# Patient Record
Sex: FEMALE | Race: WHITE | NOT HISPANIC OR LATINO | Employment: UNEMPLOYED | URBAN - NONMETROPOLITAN AREA
[De-identification: names, ages, dates, MRNs, and addresses within clinical notes are randomized per-mention and may not be internally consistent; named-entity substitution may affect disease eponyms.]

---

## 2017-07-07 ENCOUNTER — HISTORY (OUTPATIENT)
Dept: INTERNAL MEDICINE | Facility: OTHER | Age: 37
End: 2017-07-07

## 2017-07-07 ENCOUNTER — OFFICE VISIT - GICH (OUTPATIENT)
Dept: INTERNAL MEDICINE | Facility: OTHER | Age: 37
End: 2017-07-07

## 2017-07-07 DIAGNOSIS — R22.33 LOCALIZED SWELLING, MASS AND LUMP, UPPER LIMB, BILATERAL: ICD-10-CM

## 2017-07-07 DIAGNOSIS — Z91.09 OTHER ALLERGY STATUS, OTHER THAN TO DRUGS AND BIOLOGICAL SUBSTANCES: ICD-10-CM

## 2017-07-07 DIAGNOSIS — D72.10 EOSINOPHILIA: ICD-10-CM

## 2017-07-07 LAB
ABSOLUTE BASOPHILS - HISTORICAL: 0.1 THOU/CU MM
ABSOLUTE EOSINOPHILS - HISTORICAL: 0.9 THOU/CU MM
ABSOLUTE IMMATURE GRANULOCYTES(METAS,MYELOS,PROS) - HISTORICAL: 0 THOU/CU MM
ABSOLUTE LYMPHOCYTES - HISTORICAL: 1.5 THOU/CU MM (ref 0.9–2.9)
ABSOLUTE MONOCYTES - HISTORICAL: 0.4 THOU/CU MM
ABSOLUTE NEUTROPHILS - HISTORICAL: 4 THOU/CU MM (ref 1.7–7)
BASOPHILS # BLD AUTO: 0.7 %
EOSINOPHIL NFR BLD AUTO: 12.5 %
ERYTHROCYTE [DISTWIDTH] IN BLOOD BY AUTOMATED COUNT: 11.8 % (ref 11.5–15.5)
HCT VFR BLD AUTO: 40.4 % (ref 33–51)
HEMOGLOBIN: 14 G/DL (ref 12–16)
IMMATURE GRANULOCYTES(METAS,MYELOS,PROS) - HISTORICAL: 0.1 %
LYMPHOCYTES NFR BLD AUTO: 21.8 % (ref 20–44)
MCH RBC QN AUTO: 32.3 PG (ref 26–34)
MCHC RBC AUTO-ENTMCNC: 34.7 G/DL (ref 32–36)
MCV RBC AUTO: 93 FL (ref 80–100)
MONOCYTES NFR BLD AUTO: 5.6 %
NEUTROPHILS NFR BLD AUTO: 59.3 % (ref 42–72)
PLATELET # BLD AUTO: 178 THOU/CU MM (ref 140–440)
PMV BLD: 9.4 FL (ref 6.5–11)
RED BLOOD COUNT - HISTORICAL: 4.33 MIL/CU MM (ref 4–5.2)
WHITE BLOOD COUNT - HISTORICAL: 6.8 THOU/CU MM (ref 4.5–11)

## 2017-07-07 ASSESSMENT — PATIENT HEALTH QUESTIONNAIRE - PHQ9: SUM OF ALL RESPONSES TO PHQ QUESTIONS 1-9: 5

## 2017-07-07 ASSESSMENT — ANXIETY QUESTIONNAIRES
6. BECOMING EASILY ANNOYED OR IRRITABLE: SEVERAL DAYS
7. FEELING AFRAID AS IF SOMETHING AWFUL MIGHT HAPPEN: SEVERAL DAYS
GAD7 TOTAL SCORE: 11
4. TROUBLE RELAXING: MORE THAN HALF THE DAYS
1. FEELING NERVOUS, ANXIOUS, OR ON EDGE: MORE THAN HALF THE DAYS
3. WORRYING TOO MUCH ABOUT DIFFERENT THINGS: MORE THAN HALF THE DAYS
2. NOT BEING ABLE TO STOP OR CONTROL WORRYING: MORE THAN HALF THE DAYS
5. BEING SO RESTLESS THAT IT IS HARD TO SIT STILL: SEVERAL DAYS

## 2017-11-26 ENCOUNTER — HEALTH MAINTENANCE LETTER (OUTPATIENT)
Age: 37
End: 2017-11-26

## 2017-12-28 NOTE — PATIENT INSTRUCTIONS
Patient Information     Patient Name MRN Sex Rosie Harkins [id number] Female 1980      Patient Instructions by Anayeli Zhang DO at 2017  8:40 AM     Author:  Anayeli Zhang DO Service:  (none) Author Type:  PHYS- Osteopathic     Filed:  2017  9:17 AM Encounter Date:  2017 Status:  Signed     :  Anayeli Zhang DO (PHYS- Osteopathic)            - Return/call as needed for follow-up should any new symptoms develop, for worsening of current symptoms or if symptoms do not resolve

## 2017-12-28 NOTE — PROGRESS NOTES
Patient Information     Patient Name MRN Rosie Duran [id number] Female 1980      Progress Notes by Anayeli Zhang DO at 2017  8:40 AM     Author:  Anayeli Zhang DO Service:  (none) Author Type:  PHYS- Osteopathic     Filed:  2017 10:45 PM Encounter Date:  2017 Status:  Signed     :  Anayeli Zhang DO (PHYS- Osteopathic)            Chief Complaint     Patient presents with       Lump      glands swelling under arms x 4 months        Subjective:   Ms. Waters is a 37 y.o. female  seen for the acute concern today of swollen glands under her arm bilaterally.  She noted these off and on over the last 3 months.  In 2017, she had an excisional biopsy of a breast lump and that morning was the first day she noted the lymph node.  She does report that the lumps are tender.  She denies any other symptoms of infection.  The records from her biopsy are reviewed in care everywhere and it showed that she had a persistent lump that had previously been biopsied and identified as a fibroadenoma.  Due to persistence of the lump she requested excision. Pathology from this showed fibrocystic change with areas showing pseudo-angiomatous stromal hyperplasia (PASH).  No evidence of atypical epithelial hyperplasia or malignancy.  More recently she has noted improvement from prior.    She is also concerned that this is related to the high eosinophil count that she has been told she has had in the past. We discussed that elevated eosinophils can be from several things including allergies, certain infections and autoimmune illness.  She does report have occasional allergy symptoms and is open to meds.    She  reports that she has never smoked. She has never used smokeless tobacco.  Rare alcohol use, no drug use.  She does not have a family history of breast cancer in her immediate family however does have breast and colon cancer in grandparents.  She does not have any known drug allergies.    Past  "medical history reviewed as below:     Past Medical History:     Diagnosis  Date     Fibroadenoma of right breast      Infertility      Irritable bowel    .  Current Outpatient Prescriptions on File Prior to Visit       Medication  Sig Dispense Refill     Cholecalciferol, Vitamin D3, (VITAMIN D-3) 5,000 unit tab Take  by mouth once daily.  0     No current facility-administered medications on file prior to visit.          ROS:   Pertinent ROS was performed and was negative, including for fever, chills, sores in her mouth, difficulty swallowing, SOB, palpitations, chest pain, changes in bowel or bladder, increased MARK, skin rashes or lesions.  Discharge from nipple or breast changes. No other concerns, with exception of HPI above.      Objective:    /68  Pulse 68  Temp 96.3  F (35.7  C) (Tympanic)  Resp 18  Ht 1.651 m (5' 5\")  Wt 49.5 kg (109 lb 2 oz)  Breastfeeding? No  BMI 18.16 kg/m2  GEN: Vitals reviewed.  Patient is in no acute distress. Cooperative with exam.  HEENT: Normocephalic atraumatic.  Pupils equally round.  No scleral icterus, no conjunctival erythema. Oropharynx with no erythema or exudates. Dentition adequate.  LYMPH: No LAD noted in the neck bilaterally.  No LAD in groin.  No LAD noted in left axilla.  Very small area in right axilla (2-3mm across) of hardened tissue possibly c/w resolving LN.  CV: Heart regular in rate and rhythm with no murmur.    LUNGS: Lungs clear to auscultation bilaterally.  Chest rise equal bilaterally.  No accessory muscle use.  ABD:  Soft, nontender, and nondistended.  No rebound. Bowel sounds positive.  SKIN: Warm and dry to touch.  No rash on face, arms and legs.  Axilla is clear shaven with a few areas of folliculitis bilaterally.  EXT: No clubbing or cyanosis.  No peripheral edema.  PSYCH: Mood is good although obviously anxious.  Affect is appropriate.  Thought processes are normal.    LABS: 7/7/2017 - Personally ordered/reviewed  Results for orders placed " or performed in visit on 07/07/17      CBC WITH AUTO DIFFERENTIAL      Result  Value Ref Range    WHITE BLOOD COUNT         6.8 4.5 - 11.0 thou/cu mm    RED BLOOD COUNT           4.33 4.00 - 5.20 mil/cu mm    HEMOGLOBIN                14.0 12.0 - 16.0 g/dL    HEMATOCRIT                40.4 33.0 - 51.0 %    MCV                       93 80 - 100 fL    MCH                       32.3 26.0 - 34.0 pg    MCHC                      34.7 32.0 - 36.0 g/dL    RDW                       11.8 11.5 - 15.5 %    PLATELET COUNT            178 140 - 440 thou/cu mm    MPV                       9.4 6.5 - 11.0 fL    NEUTROPHILS               59.3 42.0 - 72.0 %    LYMPHOCYTES               21.8 20.0 - 44.0 %    MONOCYTES                 5.6 <12.0 %    EOSINOPHILS               12.5 (H) <8.0 %    BASOPHILS                 0.7 <3.0 %    IMMATURE GRANULOCYTES(METAS,MYELOS,PROS) 0.1 %    ABSOLUTE NEUTROPHILS      4.0 1.7 - 7.0 thou/cu mm    ABSOLUTE LYMPHOCYTES      1.5 0.9 - 2.9 thou/cu mm    ABSOLUTE MONOCYTES        0.4 <0.9 thou/cu mm    ABSOLUTE EOSINOPHILS      0.9 (H) <0.5 thou/cu mm    ABSOLUTE BASOPHILS        0.1 <0.3 thou/cu mm    ABSOLUTE IMMATURE GRANULOCYTES(METAS,MYELOS,PROS) 0.0 <=0.3 thou/cu mm           Assessment/Plan:   1. Lump in armpit, bilateral  - she was counseled and reassured today that most likely the lumps are normal reactive lymph nodes given the tenderness and waxing and waning nature of them.  She was informed that unfortunately the small size at this time precludes any accurate imaging.  We did discuss that I will place an US order and if the Lymph node returns she can schedule this when it is still present.    - we did discuss that her folliculitis on the skin very well may be causing the LAD.  She is to monitor this.  - US UPPER EXTREMITY SOFT TISSUE RIGHT; Future    2. Eosinophilia  - cbc obtained to assess extent of eosinophilia  - etiology unknown at this time however possibilities include environmental  allergies, medication reactions and less likely underlying autoimmune illness or parasitic infection  - at this time we will trial allegra and see if this helps, we will consider repeat CBC after  - if she does continue to have elevated eosinophils may consider additional testing vs referral to allergy/immunology   - CBC WITH AUTO DIFFERENTIAL    3. Environmental allergies  - will trial allegra for allergies and see if blood work improves.  - fexofenadine (ALLEGRA ALLERGY) 180 mg tablet; Take 1 tablet by mouth once daily with a meal.  Dispense: 30 tablet; Refill: 11      Return if symptoms worsen or fail to improve.    Patient Instructions   - Return/call as needed for follow-up should any new symptoms develop, for worsening of current symptoms or if symptoms do not resolve          LULU COTO, DO   7/12/2017 10:19 PM    This document was prepared using voice generated softwear. While every attempt was made for accuracy, grammatical errors may exist.

## 2017-12-30 NOTE — NURSING NOTE
Patient Information     Patient Name MRN Sex Rosie Harkins [id number] Female 1980      Nursing Note by Summer Garrido at 2017  8:40 AM     Author:  Summer Garrido Service:  (none) Author Type:  (none)     Filed:  2017  8:41 AM Encounter Date:  2017 Status:  Signed     :  Summer Garrido            Patient presents to clinic experiencing swelling under arms after having lump removed in right breast in April.  Lump was found to be benign.    Summer Garrido LPN..................2017  8:37 AM

## 2018-01-27 VITALS
HEIGHT: 65 IN | BODY MASS INDEX: 18.18 KG/M2 | DIASTOLIC BLOOD PRESSURE: 68 MMHG | HEART RATE: 68 BPM | SYSTOLIC BLOOD PRESSURE: 104 MMHG | RESPIRATION RATE: 18 BRPM | TEMPERATURE: 96.3 F | WEIGHT: 109.13 LBS

## 2018-01-30 ASSESSMENT — PATIENT HEALTH QUESTIONNAIRE - PHQ9: SUM OF ALL RESPONSES TO PHQ QUESTIONS 1-9: 5

## 2018-01-30 ASSESSMENT — ANXIETY QUESTIONNAIRES: GAD7 TOTAL SCORE: 11

## 2018-02-27 ENCOUNTER — DOCUMENTATION ONLY (OUTPATIENT)
Dept: FAMILY MEDICINE | Facility: OTHER | Age: 38
End: 2018-02-27

## 2018-02-27 PROBLEM — K58.9 IRRITABLE BOWEL SYNDROME: Status: ACTIVE | Noted: 2018-02-27

## 2018-02-27 RX ORDER — FEXOFENADINE HCL 180 MG/1
180 TABLET ORAL DAILY
COMMUNITY
Start: 2017-07-12

## 2018-07-23 NOTE — PROGRESS NOTES
Patient Information     Patient Name  Rosie Waters MRN  [id number] Sex  Female   1980      Letter by Anayeli Zhang DO at      Author:  Anayeli Zhang DO Service:  (none) Author Type:  (none)    Filed:   Encounter Date:  2017 Status:  (Other)           Rosie Waters  [address]  [facility] [address]          2017    Dear Ms. Waters:    Following are the tests completed during your last clinic visit.  The results of these tests are included below.  Eosinophils are high as discussed.  Otherwise everything is normal.      Results for orders placed or performed in visit on 17      CBC WITH AUTO DIFFERENTIAL      Result  Value Ref Range    WHITE BLOOD COUNT         6.8 4.5 - 11.0 thou/cu mm    RED BLOOD COUNT           4.33 4.00 - 5.20 mil/cu mm    HEMOGLOBIN                14.0 12.0 - 16.0 g/dL    HEMATOCRIT                40.4 33.0 - 51.0 %    MCV                       93 80 - 100 fL    MCH                       32.3 26.0 - 34.0 pg    MCHC                      34.7 32.0 - 36.0 g/dL    RDW                       11.8 11.5 - 15.5 %    PLATELET COUNT            178 140 - 440 thou/cu mm    MPV                       9.4 6.5 - 11.0 fL    NEUTROPHILS               59.3 42.0 - 72.0 %    LYMPHOCYTES               21.8 20.0 - 44.0 %    MONOCYTES                 5.6 <12.0 %    EOSINOPHILS               12.5 (H) <8.0 %    BASOPHILS                 0.7 <3.0 %    IMMATURE GRANULOCYTES(METAS,MYELOS,PROS) 0.1 %    ABSOLUTE NEUTROPHILS      4.0 1.7 - 7.0 thou/cu mm    ABSOLUTE LYMPHOCYTES      1.5 0.9 - 2.9 thou/cu mm    ABSOLUTE MONOCYTES        0.4 <0.9 thou/cu mm    ABSOLUTE EOSINOPHILS      0.9 (H) <0.5 thou/cu mm    ABSOLUTE BASOPHILS        0.1 <0.3 thou/cu mm    ABSOLUTE IMMATURE GRANULOCYTES(METAS,MYELOS,PROS) 0.0 <=0.3 thou/cu mm        If you have any further questions or problems contact my office at  [phone] and schedule an appointment.    Thank you,    Anayeli Zhang DO  Internal  [facility]     Reviewed and electronically signed by provider.

## 2018-09-27 ENCOUNTER — OFFICE VISIT (OUTPATIENT)
Dept: FAMILY MEDICINE | Facility: OTHER | Age: 38
End: 2018-09-27
Attending: NURSE PRACTITIONER
Payer: COMMERCIAL

## 2018-09-27 VITALS
SYSTOLIC BLOOD PRESSURE: 100 MMHG | DIASTOLIC BLOOD PRESSURE: 70 MMHG | TEMPERATURE: 98.5 F | BODY MASS INDEX: 18.14 KG/M2 | OXYGEN SATURATION: 96 % | HEART RATE: 58 BPM | WEIGHT: 109 LBS

## 2018-09-27 DIAGNOSIS — S40.269A: Primary | ICD-10-CM
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
## 2018-09-27 PROCEDURE — 99213 OFFICE O/P EST LOW 20 MIN: CPT | Performed by: NURSE PRACTITIONER

## 2018-09-27 RX ORDER — DOXYCYCLINE 100 MG/1
200 CAPSULE ORAL ONCE
Qty: 2 CAPSULE | Refills: 0 | Status: SHIPPED | OUTPATIENT
Start: 2018-09-27 | End: 2018-09-27

## 2018-09-27 NOTE — NURSING NOTE
Patient presents to clinic today for a tick bite on patients back. The tick was taken off today.  Rosa Malone CMA..............9/27/2018........3:59 PM    Medication Reconciliation: complete    Rosa Malone CMA

## 2018-09-27 NOTE — MR AVS SNAPSHOT
After Visit Summary   9/27/2018    Rosie Waters    MRN: [id number]           Patient Information     Date Of Birth          1980        Visit Information        Provider Department      9/27/2018 4:15 PM Raisa Gongora NP [facility]        Today's Diagnoses     Tick bite of scapular region, initial encounter    -  1      Care Instructions    Doxycycline 200 mg one time for prophylactic treatment of Lyme's Disease.  Wash tick bite with soap and water. Apply antibiotic ointment to site 1-2 times daily until healed.  Watch for flu like illness such as fevers and chills; arthritis type pain such as joint pains and stiffness, fatigue, headaches, rash. Follow up if you develop any of these you should be seen for further evaluation.       Tick Facts    Ticks are small arachnids that feed on the blood of rodents, rabbits, birds, deer, dogs, and humans. Ticks do not fly and do not drop from trees. They climb to the tips of plants along trails and attach themselves to you as you brush against the plant. Ticks may also attach to you if you come in contact with an infested animal.  Avoiding ticks  The following tips will help you avoid ticks:    When walking in tick-infested areas, tuck your pants into your boots or socks, and tuck your shirt into your pants.    Wear light-colored clothing so you can easily see any ticks that get on you.    Apply a tick repellent to pants, socks, and shoes.    Avoid brushing against the plants along trails.    Check yourself and your children at the end of each day when hiking through tick-infested areas. Don't forget to check in your hair as well.  Removing ticks  Removing ticks right away will reduce the chance of disease. Here are some tips for removing ticks:    If possible, have someone else remove the tick from you.    Protect your hands from exposure to the tick by using a tissue or rubber glove.    Ticks have hook-like barbs on their  mouth, which they use to attach themselves. Use tweezers or small needle-nose pliers instead of your fingers when removing a tick. Grasp the head as close to the skin as possible. Be careful not to squeeze the body, which would inject more fluid from the tick into your skin.    Pull gently and slowly away from skin until the mouthparts let go. If the tick fails to let go, stop pulling. While holding the head with tweezers, slowly turn it 90 degrees. Then, gently pull away from the skin again. This movement may unlock the tick's jaw and make it easier to remove.    Once you have removed the tick, look closely at the bite area. If you think there are still parts of the tick in your skin that you can't remove, contact your doctor.    Wash your hands and the bite site with soap and water.  Do not:    Crush or squeeze the tick with the tweezers.    Jerk the tick.    Burn or prick the tick.    Try to suffocate the tick with petroleum jelly or nail polish.  Identifying ticks  Most tick bites are harmless. But some ticks carry diseases, such as Lyme disease and [place] spotted fever. These can spread to people. Lyme disease is of greatest concern. It is carried by only one type of tick, the deer tick. Many public health departments are able to identify a tick to figure out if it is the type that causes Lyme disease. If this service is available in your area, bring the removed tick in a zip-top bag or jar to the public health department for identification. If you cannot identify the tick and if you were bitten in an area of the country where there is a risk of Lyme disease, contact your doctor.  Date Last Reviewed: 9/1/2016 2000-2017 [employer]. [address] [place] [address], [place] [address]. All rights reserved. This information is not intended as a substitute for professional medical care. Always follow your healthcare professional's instructions.        Tick Bites  Ticks are small arachnids that feed  "on the blood of rodents, rabbits, birds, deer, dogs, and people. A tick bite may cause a reaction like a spider bite. You may have redness, itching, and slight swelling at the site. Sometimes you may have no reaction where the tick bit you.  Ticks may gorge themselves for days before you find and remove them. The bites themselves aren't cause for concern. But ticks can carry and pass on illnesses such as Lyme disease and [place] spotted fever. Both diseases begin with a rash and symptoms similar to the flu. In advanced stages, these diseases can be quite serious.     A \"bull's eye\" rash is a common symptom of Lyme disease.   When to go to the emergency room (ER)  Not all ticks carry disease. And a tick must stay attached for at least 24 hours to infect you. If you find a tick, don't panic. Try to carefully remove it with tweezers. Grasp the tick near its head and pull without twisting. If you can't easily dislodge the tick or if you leave the head in your skin, get medical care right away.  What to expect in the ER    The tick or any parts of the tick will be removed and the bite will be cleaned.    To prevent disease, you may be given antibiotics. Both Lyme disease and [place] spotted fever respond quickly to these medicines.    You may be asked to see your healthcare provider for a blood test to check for Lyme disease.  Follow-up care  Some states and counties have services that test ticks for Lyme disease and other diseases. Check with your local officials to see if this service is available in your area.  If you remove a tick yourself, watch for signs of a tick-borne illness. Symptoms may show up within a few days or weeks after a bite. Call your healthcare provider if you notice any of the following:    Rash. The rash may spread outward in a ring from a hard white lump. Or it may move up your arms and legs to your chest.    Chills and fever    Body aches and joint pain    Severe headache  Date " "Last Reviewed: 12/1/2016 2000-2017 [employer]. [address] [place] [address], [place] [address]. All rights reserved. This information is not intended as a substitute for professional medical care. Always follow your healthcare professional's instructions.                Follow-ups after your visit        Your next 10 appointments already scheduled     Sep 27, 2018  4:15 PM CDT   [facility] with Raisa Gongora NP   [facility]    [address]  [place] [address] [phone]              Who to contact     If you have questions or need follow up information about today's clinic visit or your schedule please contact [facility] directly at [phone].  Normal or non-critical lab and imaging results will be communicated to you by MyChart, letter or phone within 4 business days after the clinic has received the results. If you do not hear from us within 7 days, please contact the clinic through MyChart or phone. If you have a critical or abnormal lab result, we will notify you by phone as soon as possible.  Submit refill requests through [employer] or call your pharmacy and they will forward the refill request to us. Please allow 3 business days for your refill to be completed.          Additional Information About Your Visit        [employer] Information     [employer] lets you send messages to your doctor, view your test results, renew your prescriptions, schedule appointments and more. To sign up, go to [email or web address] . Click on \"Log in\" on the left side of the screen, which will take you to the Welcome page. Then click on \"Sign up Now\" on the right side of the page.     You will be asked to enter the access code listed below, as well as some personal information. Please follow the directions to create your username and password.     Your access code is: [email or web address]  Expires: 12/26/2018  4:11 PM     Your access " code will  in 90 days. If you need help or a new code, please call your [facility] clinic or [phone].        Care EveryWhere ID     This is your Care EveryWhere ID. This could be used by other organizations to access your [facility] medical records  [id number]        Your Vitals Were     Pulse Temperature Last Period Pulse Oximetry Breastfeeding? BMI (Body Mass Index)    58 98.5  F (36.9  C) (Tympanic) 09/10/2018 (Exact Date) 96% No 18.14 kg/m2       Blood Pressure from Last 3 Encounters:   18 100/70   17 104/68   01/28/15 105/73    Weight from Last 3 Encounters:   18 109 lb (49.4 kg)   17 109 lb 2 oz (49.5 kg)   14 110 lb (49.9 kg)              Today, you had the following     No orders found for display         Today's Medication Changes          These changes are accurate as of 18  4:11 PM.  If you have any questions, ask your nurse or doctor.               Start taking these medicines.        Dose/Directions    doxycycline 100 MG capsule   Commonly known as:  VIBRAMYCIN   Used for:  Tick bite of scapular region, initial encounter   Started by:  Raisa Gongora NP        Dose:  200 mg   Take 2 capsules (200 mg) by mouth once for 1 dose   Quantity:  2 capsule   Refills:  0            Where to get your medicines      These medications were sent to [facility] [address] [place] [address]     Phone:  [phone]     doxycycline 100 MG capsule                Primary Care Provider Fax #    Physician No Ref-Primary [phone]       No address on file        Equal Access to Services     LEONIDAS BLACK AH: Kofi Garcia, fidelina medellin, qakostas kaalchristopher santos. So [facility] [phone].    ATENCIÓN: Si habla español, tiene a cochran disposición servicios gratuitos de asistencia lingüística. Llame al [phone].    We comply with applicable federal  civil rights laws and [place] laws. We do not discriminate on the basis of race, color, national origin, age, disability, sex, sexual orientation, or gender identity.            Thank you!     Thank you for choosing [facility]  for your care. Our goal is always to provide you with excellent care. Hearing back from our patients is one way we can continue to improve our services. Please take a few minutes to complete the written survey that you may receive in the mail after your visit with us. Thank you!             Your Updated Medication List - Protect others around you: Learn how to safely use, store and throw away your medicines at www.disposemymeds.org.          This list is accurate as of 9/27/18  4:11 PM.  Always use your most recent med list.                   Brand Name Dispense Instructions for use Diagnosis    doxycycline 100 MG capsule    VIBRAMYCIN    2 capsule    Take 2 capsules (200 mg) by mouth once for 1 dose    Tick bite of scapular region, initial encounter       fexofenadine 180 MG tablet    ALLEGRA     Take 180 mg by mouth daily        MULTIVITAMIN GUMMIES ADULT PO      Take  by mouth.        * vitamin D 2000 units Caps      Take 10,000 Units by mouth        * D 5000 5000 units Tabs   Generic drug:  Cholecalciferol      Take by mouth daily        * Notice:  This list has 2 medication(s) that are the same as other medications prescribed for you. Read the directions carefully, and ask your doctor or other care provider to review them with you.

## 2018-09-27 NOTE — PROGRESS NOTES
HPI:    Rosie Waters is a 38 year old female  who presents to clinic today for tick bite.    States upper back was sore yesterday and today.  Today noted an embedded deer tick on her upper back.  Patient brought tick into visit for identification.  Tick is a deer tick and is engorged.  Denies any other symptoms - no neck pain, headaches, fevers, fatigue, aches, etc.  Patient was able to have her  remove the tick at home with a tick remover including the head.  States she probably got the tick from their dog.        Past Medical History:   Diagnosis Date     Benign neoplasm of right breast     No Comments Provided     Irritable bowel syndrome without diarrhea     No Comments Provided     Personal history of other medical treatment (CODE)     No Comments Provided     Past Surgical History:   Procedure Laterality Date      SECTION      No Comments Provided     OTHER SURGICAL HISTORY      ,HERNIA REPAIR     Social History   Substance Use Topics     Smoking status: Never Smoker     Smokeless tobacco: Never Used     Alcohol use Yes      Comment: Alcoholic Drinks/day: rarely     Current Outpatient Prescriptions   Medication Sig Dispense Refill     Cholecalciferol (D 5000) 5000 UNITS TABS Take by mouth daily       Cholecalciferol (VITAMIN D) 2000 UNITS CAPS Take 10,000 Units by mouth        fexofenadine (ALLEGRA) 180 MG tablet Take 180 mg by mouth daily       Multiple Vitamins-Minerals (MULTIVITAMIN GUMMIES ADULT PO) Take  by mouth.       No Known Allergies      Past medical history, past surgical history, current medications and allergies reviewed and accurate to the best of my knowledge.        ROS:  Refer to HPI    /70  Pulse 58  Temp 98.5  F (36.9  C) (Tympanic)  Wt 109 lb (49.4 kg)  LMP 09/10/2018 (Exact Date)  SpO2 96%  Breastfeeding? No  BMI 18.14 kg/m2    EXAM:  General Appearance: Well appearing adult female, appropriate appearance for age. No acute distress  Respiratory: normal  chest wall and respirations.  Normal effort. No cough appreciated.  Cardiac: RRR with no murmurs  Musculoskeletal:  Normal gait.  Equal movement of bilateral upper extremities.  Equal movement of bilateral lower extremities.    Dermatological: Left upper scapular area with single erythematous tick bite lesion, no bulls eye lesion.  Psychological: normal affect, alert and pleasant          ASSESSMENT/PLAN:  1. Tick bite of scapular region, initial encounter    - doxycycline (VIBRAMYCIN) 100 MG capsule; Take 2 capsules (200 mg) by mouth once for 1 dose  Dispense: 2 capsule; Refill: 0    Doxycycline 200 mg one time for prophylactic treatment of Lyme's Disease.    Wash tick bite with soap and water. Apply antibiotic ointment to site 1-2 times daily until healed.    Discussed warning signs/symptoms indicative of need to f/u    Follow up if symptoms persist or worsen or concerns

## 2018-09-27 NOTE — PATIENT INSTRUCTIONS
Doxycycline 200 mg one time for prophylactic treatment of Lyme's Disease.  Wash tick bite with soap and water. Apply antibiotic ointment to site 1-2 times daily until healed.  Watch for flu like illness such as fevers and chills; arthritis type pain such as joint pains and stiffness, fatigue, headaches, rash. Follow up if you develop any of these you should be seen for further evaluation.       Tick Facts    Ticks are small arachnids that feed on the blood of rodents, rabbits, birds, deer, dogs, and humans. Ticks do not fly and do not drop from trees. They climb to the tips of plants along trails and attach themselves to you as you brush against the plant. Ticks may also attach to you if you come in contact with an infested animal.  Avoiding ticks  The following tips will help you avoid ticks:    When walking in tick-infested areas, tuck your pants into your boots or socks, and tuck your shirt into your pants.    Wear light-colored clothing so you can easily see any ticks that get on you.    Apply a tick repellent to pants, socks, and shoes.    Avoid brushing against the plants along trails.    Check yourself and your children at the end of each day when hiking through tick-infested areas. Don't forget to check in your hair as well.  Removing ticks  Removing ticks right away will reduce the chance of disease. Here are some tips for removing ticks:    If possible, have someone else remove the tick from you.    Protect your hands from exposure to the tick by using a tissue or rubber glove.    Ticks have hook-like barbs on their mouth, which they use to attach themselves. Use tweezers or small needle-nose pliers instead of your fingers when removing a tick. Grasp the head as close to the skin as possible. Be careful not to squeeze the body, which would inject more fluid from the tick into your skin.    Pull gently and slowly away from skin until the mouthparts let go. If the tick fails to let go, stop pulling. While  holding the head with tweezers, slowly turn it 90 degrees. Then, gently pull away from the skin again. This movement may unlock the tick's jaw and make it easier to remove.    Once you have removed the tick, look closely at the bite area. If you think there are still parts of the tick in your skin that you can't remove, contact your doctor.    Wash your hands and the bite site with soap and water.  Do not:    Crush or squeeze the tick with the tweezers.    Jerk the tick.    Burn or prick the tick.    Try to suffocate the tick with petroleum jelly or nail polish.  Identifying ticks  Most tick bites are harmless. But some ticks carry diseases, such as Lyme disease and [place] spotted fever. These can spread to people. Lyme disease is of greatest concern. It is carried by only one type of tick, the deer tick. Many public health departments are able to identify a tick to figure out if it is the type that causes Lyme disease. If this service is available in your area, bring the removed tick in a zip-top bag or jar to the public health department for identification. If you cannot identify the tick and if you were bitten in an area of the country where there is a risk of Lyme disease, contact your doctor.  Date Last Reviewed: 9/1/2016 2000-2017 [employer]. [address] [place] [address], [place] [address]. All rights reserved. This information is not intended as a substitute for professional medical care. Always follow your healthcare professional's instructions.        Tick Bites  Ticks are small arachnids that feed on the blood of rodents, rabbits, birds, deer, dogs, and people. A tick bite may cause a reaction like a spider bite. You may have redness, itching, and slight swelling at the site. Sometimes you may have no reaction where the tick bit you.  Ticks may gorge themselves for days before you find and remove them. The bites themselves aren't cause for concern. But ticks can carry and pass on  "illnesses such as Lyme disease and [place] spotted fever. Both diseases begin with a rash and symptoms similar to the flu. In advanced stages, these diseases can be quite serious.     A \"bull's eye\" rash is a common symptom of Lyme disease.   When to go to the emergency room (ER)  Not all ticks carry disease. And a tick must stay attached for at least 24 hours to infect you. If you find a tick, don't panic. Try to carefully remove it with tweezers. Grasp the tick near its head and pull without twisting. If you can't easily dislodge the tick or if you leave the head in your skin, get medical care right away.  What to expect in the ER    The tick or any parts of the tick will be removed and the bite will be cleaned.    To prevent disease, you may be given antibiotics. Both Lyme disease and [place] spotted fever respond quickly to these medicines.    You may be asked to see your healthcare provider for a blood test to check for Lyme disease.  Follow-up care  Some states and [facility] have services that test ticks for Lyme disease and other diseases. Check with your local officials to see if this service is available in your area.  If you remove a tick yourself, watch for signs of a tick-borne illness. Symptoms may show up within a few days or weeks after a bite. Call your healthcare provider if you notice any of the following:    Rash. The rash may spread outward in a ring from a hard white lump. Or it may move up your arms and legs to your chest.    Chills and fever    Body aches and joint pain    Severe headache  Date Last Reviewed: 12/1/2016 2000-2017 [employer]. [address] [place] [address], [place] [address]. All rights reserved. This information is not intended as a substitute for professional medical care. Always follow your healthcare professional's instructions.        "

## 2020-08-10 ENCOUNTER — TELEPHONE (OUTPATIENT)
Dept: CHIROPRACTIC MEDICINE | Facility: OTHER | Age: 40
End: 2020-08-10

## 2020-10-20 ENCOUNTER — RESULTS ONLY (OUTPATIENT)
Dept: LAB | Age: 40
End: 2020-10-20

## 2020-10-20 ENCOUNTER — APPOINTMENT (OUTPATIENT)
Dept: FAMILY MEDICINE | Facility: OTHER | Age: 40
End: 2020-10-20
Attending: CHIROPRACTOR

## 2020-10-20 ENCOUNTER — MEDICAL CORRESPONDENCE (OUTPATIENT)
Dept: HEALTH INFORMATION MANAGEMENT | Facility: OTHER | Age: 40
End: 2020-10-20

## 2020-10-20 ENCOUNTER — APPOINTMENT (OUTPATIENT)
Dept: LAB | Facility: OTHER | Age: 40
End: 2020-10-20
Attending: CHIROPRACTOR

## 2020-10-20 LAB
ALBUMIN SERPL-MCNC: 4.5 G/DL (ref 3.5–5.7)
ALBUMIN UR-MCNC: NEGATIVE MG/DL
ALP SERPL-CCNC: 43 U/L (ref 34–104)
ALT SERPL W P-5'-P-CCNC: 12 U/L (ref 7–52)
ANION GAP SERPL CALCULATED.3IONS-SCNC: 6 MMOL/L (ref 3–14)
APPEARANCE UR: CLEAR
AST SERPL W P-5'-P-CCNC: 18 U/L (ref 13–39)
BACTERIA #/AREA URNS HPF: ABNORMAL /HPF
BILIRUB SERPL-MCNC: 0.8 MG/DL (ref 0.3–1)
BILIRUB UR QL STRIP: NEGATIVE
BUN SERPL-MCNC: 16 MG/DL (ref 7–25)
CALCIUM SERPL-MCNC: 9.6 MG/DL (ref 8.6–10.3)
CHLORIDE SERPL-SCNC: 103 MMOL/L (ref 98–107)
CHOLEST SERPL-MCNC: 167 MG/DL
CO2 SERPL-SCNC: 28 MMOL/L (ref 21–31)
COLOR UR AUTO: YELLOW
CREAT SERPL-MCNC: 0.87 MG/DL (ref 0.6–1.2)
GFR SERPL CREATININE-BSD FRML MDRD: 71 ML/MIN/{1.73_M2}
GLUCOSE SERPL-MCNC: 94 MG/DL (ref 70–105)
GLUCOSE UR STRIP-MCNC: NEGATIVE MG/DL
HDLC SERPL-MCNC: 49 MG/DL (ref 23–92)
HGB UR QL STRIP: ABNORMAL
KETONES UR STRIP-MCNC: NEGATIVE MG/DL
LDLC SERPL CALC-MCNC: 103 MG/DL
LEUKOCYTE ESTERASE UR QL STRIP: NEGATIVE
NITRATE UR QL: NEGATIVE
NONHDLC SERPL-MCNC: 118 MG/DL
PH UR STRIP: 6.5 PH (ref 5–7)
POTASSIUM SERPL-SCNC: 4.1 MMOL/L (ref 3.5–5.1)
PROT SERPL-MCNC: 7.2 G/DL (ref 6.4–8.9)
RBC #/AREA URNS AUTO: 2 /HPF (ref 0–2)
SODIUM SERPL-SCNC: 137 MMOL/L (ref 134–144)
SOURCE: ABNORMAL
SP GR UR STRIP: 1 (ref 1–1.03)
SQUAMOUS #/AREA URNS AUTO: 2 /HPF (ref 0–1)
TRIGL SERPL-MCNC: 73 MG/DL
UROBILINOGEN UR STRIP-MCNC: NORMAL MG/DL (ref 0–2)
WBC #/AREA URNS AUTO: 2 /HPF (ref 0–5)

## 2020-10-20 PROCEDURE — 99499 UNLISTED E&M SERVICE: CPT | Performed by: CHIROPRACTOR

## 2023-01-14 ENCOUNTER — HOSPITAL ENCOUNTER (OUTPATIENT)
Dept: MRI IMAGING | Facility: OTHER | Age: 43
Discharge: HOME | End: 2023-01-14
Attending: NURSE PRACTITIONER
Payer: COMMERCIAL

## 2023-01-14 DIAGNOSIS — M54.2 NECK PAIN: ICD-10-CM

## 2023-01-14 DIAGNOSIS — M54.6 CHRONIC LEFT-SIDED THORACIC BACK PAIN: ICD-10-CM

## 2023-01-14 DIAGNOSIS — G89.29 CHRONIC LEFT-SIDED THORACIC BACK PAIN: ICD-10-CM

## 2023-01-14 PROCEDURE — 72141 MRI NECK SPINE W/O DYE: CPT

## 2023-01-14 PROCEDURE — 72146 MRI CHEST SPINE W/O DYE: CPT

## 2023-04-29 ENCOUNTER — HEALTH MAINTENANCE LETTER (OUTPATIENT)
Age: 43
End: 2023-04-29

## 2024-07-06 ENCOUNTER — HEALTH MAINTENANCE LETTER (OUTPATIENT)
Age: 44
End: 2024-07-06

## 2024-09-23 ENCOUNTER — MEDICAL CORRESPONDENCE (OUTPATIENT)
Dept: HEALTH INFORMATION MANAGEMENT | Facility: OTHER | Age: 44
End: 2024-09-23
Payer: COMMERCIAL

## 2025-05-11 ENCOUNTER — HEALTH MAINTENANCE LETTER (OUTPATIENT)
Age: 45
End: 2025-05-11

## 2025-07-13 ENCOUNTER — HEALTH MAINTENANCE LETTER (OUTPATIENT)
Age: 45
End: 2025-07-13